# Patient Record
Sex: FEMALE | Race: OTHER | Employment: FULL TIME | URBAN - METROPOLITAN AREA
[De-identification: names, ages, dates, MRNs, and addresses within clinical notes are randomized per-mention and may not be internally consistent; named-entity substitution may affect disease eponyms.]

---

## 2025-04-04 ENCOUNTER — APPOINTMENT (OUTPATIENT)
Dept: CT IMAGING | Age: 40
End: 2025-04-04
Attending: STUDENT IN AN ORGANIZED HEALTH CARE EDUCATION/TRAINING PROGRAM
Payer: COMMERCIAL

## 2025-04-04 ENCOUNTER — HOSPITAL ENCOUNTER (EMERGENCY)
Age: 40
Discharge: HOME | End: 2025-04-04
Attending: STUDENT IN AN ORGANIZED HEALTH CARE EDUCATION/TRAINING PROGRAM
Payer: COMMERCIAL

## 2025-04-04 ENCOUNTER — APPOINTMENT (OUTPATIENT)
Dept: GENERAL RADIOLOGY | Age: 40
End: 2025-04-04
Attending: EMERGENCY MEDICINE
Payer: COMMERCIAL

## 2025-04-04 VITALS
BODY MASS INDEX: 33.13 KG/M2 | HEIGHT: 62 IN | SYSTOLIC BLOOD PRESSURE: 138 MMHG | TEMPERATURE: 99 F | RESPIRATION RATE: 16 BRPM | WEIGHT: 180 LBS | DIASTOLIC BLOOD PRESSURE: 86 MMHG | OXYGEN SATURATION: 100 % | HEART RATE: 69 BPM

## 2025-04-04 DIAGNOSIS — R07.9 ACUTE CHEST PAIN: Primary | ICD-10-CM

## 2025-04-04 DIAGNOSIS — R51.9 ACUTE NONINTRACTABLE HEADACHE, UNSPECIFIED HEADACHE TYPE: ICD-10-CM

## 2025-04-04 DIAGNOSIS — E87.6 HYPOKALEMIA: ICD-10-CM

## 2025-04-04 LAB
ALBUMIN SERPL-MCNC: 5 G/DL (ref 3.2–4.8)
ALBUMIN/GLOB SERPL: 1.5 {RATIO} (ref 1–2)
ALP LIVER SERPL-CCNC: 116 U/L
ALT SERPL-CCNC: 21 U/L
ANION GAP SERPL CALC-SCNC: 10 MMOL/L (ref 0–18)
AST SERPL-CCNC: 21 U/L (ref ?–34)
ATRIAL RATE: 80 BPM
B-HCG UR QL: NEGATIVE
BASOPHILS # BLD AUTO: 0.03 X10(3) UL (ref 0–0.2)
BASOPHILS NFR BLD AUTO: 0.3 %
BILIRUB SERPL-MCNC: 0.4 MG/DL (ref 0.3–1.2)
BUN BLD-MCNC: 10 MG/DL (ref 9–23)
CALCIUM BLD-MCNC: 9.6 MG/DL (ref 8.7–10.6)
CHLORIDE SERPL-SCNC: 104 MMOL/L (ref 98–112)
CO2 SERPL-SCNC: 25 MMOL/L (ref 21–32)
CREAT BLD-MCNC: 0.7 MG/DL
EGFRCR SERPLBLD CKD-EPI 2021: 113 ML/MIN/1.73M2 (ref 60–?)
EOSINOPHIL # BLD AUTO: 0.08 X10(3) UL (ref 0–0.7)
EOSINOPHIL NFR BLD AUTO: 0.7 %
ERYTHROCYTE [DISTWIDTH] IN BLOOD BY AUTOMATED COUNT: 15.6 %
GLOBULIN PLAS-MCNC: 3.4 G/DL (ref 2–3.5)
GLUCOSE BLD-MCNC: 95 MG/DL (ref 70–99)
HCT VFR BLD AUTO: 41.9 %
HGB BLD-MCNC: 13.4 G/DL
IMM GRANULOCYTES # BLD AUTO: 0.03 X10(3) UL (ref 0–1)
IMM GRANULOCYTES NFR BLD: 0.3 %
LIPASE SERPL-CCNC: 32 U/L (ref 12–53)
LYMPHOCYTES # BLD AUTO: 3.17 X10(3) UL (ref 1–4)
LYMPHOCYTES NFR BLD AUTO: 28.8 %
MCH RBC QN AUTO: 23.8 PG (ref 26–34)
MCHC RBC AUTO-ENTMCNC: 32 G/DL (ref 31–37)
MCV RBC AUTO: 74.4 FL
MONOCYTES # BLD AUTO: 0.58 X10(3) UL (ref 0.1–1)
MONOCYTES NFR BLD AUTO: 5.3 %
NEUTROPHILS # BLD AUTO: 7.1 X10 (3) UL (ref 1.5–7.7)
NEUTROPHILS # BLD AUTO: 7.1 X10(3) UL (ref 1.5–7.7)
NEUTROPHILS NFR BLD AUTO: 64.6 %
OSMOLALITY SERPL CALC.SUM OF ELEC: 287 MOSM/KG (ref 275–295)
P AXIS: 53 DEGREES
P-R INTERVAL: 146 MS
PLATELET # BLD AUTO: 480 10(3)UL (ref 150–450)
POCT INFLUENZA A: NEGATIVE
POCT INFLUENZA B: NEGATIVE
POTASSIUM SERPL-SCNC: 3.4 MMOL/L (ref 3.5–5.1)
PROT SERPL-MCNC: 8.4 G/DL (ref 5.7–8.2)
Q-T INTERVAL: 380 MS
QRS DURATION: 80 MS
QTC CALCULATION (BEZET): 438 MS
R AXIS: 27 DEGREES
RBC # BLD AUTO: 5.63 X10(6)UL
SARS-COV-2 RNA RESP QL NAA+PROBE: NOT DETECTED
SODIUM SERPL-SCNC: 139 MMOL/L (ref 136–145)
T AXIS: 37 DEGREES
TROPONIN I SERPL HS-MCNC: <3 NG/L
TROPONIN I SERPL HS-MCNC: <3 NG/L
VENTRICULAR RATE: 80 BPM
WBC # BLD AUTO: 11 X10(3) UL (ref 4–11)

## 2025-04-04 PROCEDURE — 99285 EMERGENCY DEPT VISIT HI MDM: CPT

## 2025-04-04 PROCEDURE — 84484 ASSAY OF TROPONIN QUANT: CPT | Performed by: EMERGENCY MEDICINE

## 2025-04-04 PROCEDURE — 93005 ELECTROCARDIOGRAM TRACING: CPT

## 2025-04-04 PROCEDURE — 80053 COMPREHEN METABOLIC PANEL: CPT | Performed by: EMERGENCY MEDICINE

## 2025-04-04 PROCEDURE — 81025 URINE PREGNANCY TEST: CPT

## 2025-04-04 PROCEDURE — 93010 ELECTROCARDIOGRAM REPORT: CPT

## 2025-04-04 PROCEDURE — 83690 ASSAY OF LIPASE: CPT | Performed by: EMERGENCY MEDICINE

## 2025-04-04 PROCEDURE — 70450 CT HEAD/BRAIN W/O DYE: CPT | Performed by: STUDENT IN AN ORGANIZED HEALTH CARE EDUCATION/TRAINING PROGRAM

## 2025-04-04 PROCEDURE — 85025 COMPLETE CBC W/AUTO DIFF WBC: CPT | Performed by: STUDENT IN AN ORGANIZED HEALTH CARE EDUCATION/TRAINING PROGRAM

## 2025-04-04 PROCEDURE — 87502 INFLUENZA DNA AMP PROBE: CPT | Performed by: STUDENT IN AN ORGANIZED HEALTH CARE EDUCATION/TRAINING PROGRAM

## 2025-04-04 PROCEDURE — 96375 TX/PRO/DX INJ NEW DRUG ADDON: CPT

## 2025-04-04 PROCEDURE — 71045 X-RAY EXAM CHEST 1 VIEW: CPT | Performed by: EMERGENCY MEDICINE

## 2025-04-04 PROCEDURE — 96365 THER/PROPH/DIAG IV INF INIT: CPT

## 2025-04-04 PROCEDURE — 96361 HYDRATE IV INFUSION ADD-ON: CPT

## 2025-04-04 RX ORDER — KETOROLAC TROMETHAMINE 15 MG/ML
15 INJECTION, SOLUTION INTRAMUSCULAR; INTRAVENOUS ONCE
Status: COMPLETED | OUTPATIENT
Start: 2025-04-04 | End: 2025-04-04

## 2025-04-04 RX ORDER — METOCLOPRAMIDE 10 MG/1
10 TABLET ORAL 3 TIMES DAILY PRN
Qty: 20 TABLET | Refills: 0 | Status: SHIPPED | OUTPATIENT
Start: 2025-04-04 | End: 2025-05-04

## 2025-04-04 RX ORDER — MAGNESIUM SULFATE 1 G/100ML
1 INJECTION INTRAVENOUS ONCE
Status: DISCONTINUED | OUTPATIENT
Start: 2025-04-04 | End: 2025-04-04

## 2025-04-04 RX ORDER — MAGNESIUM SULFATE 1 G/100ML
1 INJECTION INTRAVENOUS ONCE
Status: COMPLETED | OUTPATIENT
Start: 2025-04-04 | End: 2025-04-04

## 2025-04-04 RX ORDER — DIPHENHYDRAMINE HYDROCHLORIDE 50 MG/ML
25 INJECTION, SOLUTION INTRAMUSCULAR; INTRAVENOUS ONCE
Status: COMPLETED | OUTPATIENT
Start: 2025-04-04 | End: 2025-04-04

## 2025-04-04 RX ORDER — METOCLOPRAMIDE HYDROCHLORIDE 5 MG/ML
10 INJECTION INTRAMUSCULAR; INTRAVENOUS ONCE
Status: COMPLETED | OUTPATIENT
Start: 2025-04-04 | End: 2025-04-04

## 2025-04-04 RX ORDER — POTASSIUM CHLORIDE 1.5 G/1.58G
20 POWDER, FOR SOLUTION ORAL DAILY
Qty: 3 PACKET | Refills: 0 | Status: SHIPPED | OUTPATIENT
Start: 2025-04-04 | End: 2025-04-07

## 2025-04-04 NOTE — ED INITIAL ASSESSMENT (HPI)
While lying in bed last night felt tightness in chest and tingling in arms- tightness in chest remains this am- also c/o feeling 'clammy\" nausea yesterday- no vomiting-   States she has had a discomfort in throat and chest since eating hotdog on 3/11-- has seen ENT and GI has endoscopy scheduled in May but feeling last night was slightly different-

## 2025-04-04 NOTE — ED PROVIDER NOTES
Patient Seen in: [facility] In [place]      History     Chief Complaint   Patient presents with    Chest Pain     Stated Complaint: chest pain tingling in bilateral arms hand and legs since yesterday    Subjective:   HPI    Patient is a 40-year-old female who presents to the emergency department for evaluation of chest pain and headache.  Patient's chest pain began sometime last night and has been ongoing all day.  It feels like a burning discomfort with some tingling in both arms and hands as well as legs.  No loss of sensation or weakness.  Patient's headache began 3 days ago and gradually worsened.  No changes in vision, no neck stiffness, no fever, no difficulty with balance, no difficulty breathing, no syncope, no other associated symptoms.  Patient does have previous history of gestational hypertension and was on blood pressure medication during pregnancy and for about a year after.  She did not needed after that.  Patient has had some chest discomfort that has been ongoing since March 11 and was precipitated by eating a hot dog and feeling like it might have gotten stuck in esophagus.  Despite that sensation patient had been able to eat and drink since.  She has been seen by a doctor, ENT and gastroenterologist and is planning to undergo endoscopy in the near future.  The chest discomfort since last night feels somewhat different.    Objective:   Past Medical History:    Essential hypertension              Past Surgical History:   Procedure Laterality Date    Removal of ovarian cyst(s)                  Social History     Socioeconomic History    Marital status:    Tobacco Use    Smoking status: Never    Smokeless tobacco: Never   Vaping Use    Vaping status: Never Used   Substance and Sexual Activity    Alcohol use: Yes     Comment: rare    Drug use: Yes     Types: Cannabis    Sexual activity: Yes     Partners: Male              Review of Systems    Positive for stated complaint:  chest pain tingling in bilateral arms hand and legs since yesterday  Other systems are as noted in HPI.  Constitutional and vital signs reviewed.      All other systems reviewed and negative except as noted above.    Physical Exam     ED Triage Vitals [04/04/25 1220]   BP (!) 174/97   Pulse 82   Resp 18   Temp 99.4 °F (37.4 °C)   Temp src Oral   SpO2 100 %   O2 Device None (Room air)       Current:/86   Pulse 69   Temp 98.6 °F (37 °C) (Temporal)   Resp 16   Ht 157.5 cm (5' 2\")   Wt 81.6 kg   LMP 04/04/2025   SpO2 100%   BMI 32.92 kg/m²         Physical Exam    Constitutional: The patient is oriented to person, place, and time, appears well-developed and well-nourished.   HENT:   Head: Normocephalic.   Nose: Nose normal.   Mouth/Throat: Oropharynx is clear and moist.   Eyes: Conjunctivae and EOM are normal. Pupils are equal, round, and reactive to light.   Neck: Normal range of motion and full passive range of motion without pain. Neck supple.   Cardiovascular: Normal rate, regular rhythm, normal heart sounds and intact distal pulses.    Pulmonary/Chest: Effort normal and breath sounds normal, no tenderness.   Abdominal: Soft. Bowel sounds are normal. There is no tenderness.   Musculoskeletal: Normal range of motion.   Neurological: alert and oriented to person, place, and time, normal strength and normal reflexes, No cranial nerve deficit or sensory deficit.   Skin: Skin is warm and dry.          ED Course/ My interpretations:     Labs Reviewed   COMP METABOLIC PANEL (14) - Abnormal; Notable for the following components:       Result Value    Potassium 3.4 (*)     Alkaline Phosphatase 116 (*)     Total Protein 8.4 (*)     Albumin 5.0 (*)     All other components within normal limits   CBC WITH DIFFERENTIAL WITH PLATELET - Abnormal; Notable for the following components:    RBC 5.63 (*)     .0 (*)     MCV 74.4 (*)     MCH 23.8 (*)     All other components within normal limits   TROPONIN I HIGH  SENSITIVITY - Normal   TROPONIN I HIGH SENSITIVITY - Normal   LIPASE - Normal   POCT PREGNANCY URINE - Normal   POCT FLU TEST - Normal    Narrative:     This assay is a rapid molecular in vitro test utilizing nucleic acid amplification of influenza A and B viral RNA.   RAPID SARS-COV-2 BY PCR - Normal   RAINBOW DRAW LAVENDER   RAINBOW DRAW LIGHT GREEN   RAINBOW DRAW BLUE     EKG    Rate, intervals and axes as noted on EKG Report.  Rate: 80  Rhythm: Sinus Rhythm  Reading: Normal sinus rhythm, normal intervals, no ST.  No previous EKG.             My independent imaging interpretation:      Procedures    Comp Metabolic Panel (14)    Troponin I (High Sensitivity)    Lipase    CBC With Differential With Platelet    XR CHEST AP PORTABLE  (CPT=71045)    CT BRAIN OR HEAD (CPT=70450)      No intracranial bleed.  All available radiology reports for this visit reviewed.      Medications given:  Orders Placed This Encounter    Comp Metabolic Panel (14)    Troponin I (High Sensitivity)    Lipase    CBC With Differential With Platelet    metoclopramide (Reglan) 5 mg/mL injection 10 mg    diphenhydrAMINE (Benadryl) 50 mg/mL  injection 25 mg    magnesium sulfate in dextrose 5% 1 g/100mL infusion premix 1 g    sodium chloride 0.9 % IV bolus 1,000 mL    ketorolac (Toradol) 15 MG/ML injection 15 mg    DISCONTD: magnesium sulfate in dextrose 5% 1 g/100mL infusion premix 1 g    metoclopramide 10 MG Oral Tab    potassium chloride 20 MEQ Oral Powd Pack           MDM      Extensive differential diagnosis was considered for the patient including migraine, tension headache, sinusitis, meningitis, subarachnoid hemorrhage, brain mass and other pathology.  I do not suspect subarachnoid hemorrhage as the patient's headache was gradual in onset and not associated with exertion and the patient has had similar headaches in the past.  I do not suspect meningitis in the absence of meningeal signs or fever.  CT of the brain demonstrated no  intracranial mass or bleeding.  Patient demonstrated neurologic examination in the ER both initially and on recheck.    Extensive differential diagnosis was considered including underlying musculoskeletal pain, acute MI, pericarditis, pneumonia, pneumothorax, cardiac, pulmonary, thromboembolic, gastrointestinal, vascular, infectious and other etiologies.    EKG was found to be nonischemic, troponin was unremarkable, remainder of the emergency department work-up was negative.     Slight basilar opacity was noted on x-ray.  Patient has no symptoms of pneumonia and has a normal white blood cell count.  I suspect likely incomplete inspiration.  Patient will recheck with her doctor.    Given patient's history, physical exam findings as well as work-up in the ER his clinical picture shows no signs of life-threatening pathology.  Patient can safely follow-up on outpatient basis.  Patient advised on the importance of follow-up and reasons to return to the ER if they were to worsen.  They demonstrated understanding, they are comfortable with the plan and will follow-up as directed.    Disposition and Plan     Clinical Impression:  1. Acute chest pain    2. Acute nonintractable headache, unspecified headache type    3. Hypokalemia         Disposition:  Discharge  4/4/2025  5:08 pm    Follow-up:  Amna Cortez DO  [address] [place] [id number] [phone]    Schedule an appointment as soon as possible for a visit in 5 day(s)  For recheck          Medications Prescribed:  Current Discharge Medication List        START taking these medications    Details   metoclopramide 10 MG Oral Tab Take 1 tablet (10 mg total) by mouth 3 (three) times daily as needed.  Qty: 20 tablet, Refills: 0      potassium chloride 20 MEQ Oral Powd Pack Take 20 mEq by mouth daily for 3 days.  Qty: 3 packet, Refills: 0                 Supplementary Documentation:                                                                            Documentation created with the aid of Dragon voice recognition software.  Although efforts were made to ensure the accuracy of the note, some inaccuracies may persist.